# Patient Record
(demographics unavailable — no encounter records)

---

## 2019-08-16 NOTE — MM
Reason for exam: screening  (asymptomatic).

Last mammogram was performed 3 years and 4 months ago.



History:

Patient is nulliparous.

Family history of breast cancer in mother.



Physical Findings:

A clinical breast exam by your physician is recommended on an annual basis and 

results should be correlated with mammographic findings.



MG 3D Screening Mammo W/Cad

Bilateral CC and MLO view(s) were taken.

Prior study comparison: April 4, 2016, mammogram.  August 25, 2014, mammogram.

The breast tissue is heterogeneously dense. This may lower the sensitivity of 

mammography.  No suspicious abnormality.  No significant changes when compared 

with prior studies.





ASSESSMENT: Negative, BI-RAD 1



RECOMMENDATION:

Routine screening mammogram of both breasts in 1 year.

## 2020-12-04 NOTE — MM
Reason for exam: screening  (asymptomatic).

Last mammogram was performed 1 year and 4 months ago.



History:

Patient is postmenopausal and is nulliparous.

Family history of breast cancer in mother.



Physical Findings:

A clinical breast exam by your physician is recommended on an annual basis and 

results should be correlated with mammographic findings.



MG 3D Screening Mammo W/Cad

Bilateral CC and MLO view(s) were taken.

Prior study comparison: August 15, 2019, bilateral MG 3d screening mammo w/cad.  

April 4, 2016, mammogram.

The breast tissue is heterogeneously dense. This may lower the sensitivity of 

mammography.  11 o'clock focal asymmetry appears larger and more defined.





ASSESSMENT: Incomplete: need additional imaging evaluation, BI-RAD 0



RECOMMENDATION:

Special view mammogram of the right breast.

(3D)

Ultrasound of the right breast.



[facility] will attempt to contact patient to return for supplemental 

views and ultrasound.

## 2020-12-11 NOTE — MM
Reason for exam: additional evaluation requested from abnormal screening.

Last mammogram was performed less than 1 month ago.



History:

Patient is postmenopausal and is nulliparous.

Family history of breast cancer in mother.



Physical Findings:

Nurse did not find any significant physical abnormalities on exam.



MG 3D Work Up W/Cad RT

Spot compression CC, spot compression MLO, and ML view(s) were taken of the right 

breast.

Prior study comparison: December 3, 2020, bilateral MG 3d screening mammo w/cad.  

August 15, 2019, bilateral MG 3d screening mammo w/cad.

There are scattered fibroglandular densities.  Focal asymmetry right upper outer 

quadrant. Nodular density disperses on compression.

This finding is changed when compared with previous exams.



These results were verbally communicated with the patient and result sheet given 

to the patient on 12/11/20.





ASSESSMENT: Incomplete: need additional imaging evaluation, BI-RAD 0



RECOMMENDATION:

Ultrasound of the right breast.

## 2020-12-11 NOTE — USB
Reason for exam: additional evaluation requested from abnormal screening.



History:

Patient is postmenopausal and is nulliparous.

Family history of breast cancer in mother.



US Breast Workup Limited RT

Technologist: Rhonda Hendrickson

Right limited breast ultrasound including focal area of concern, retroareolar and 

axilla demonstrates no cystic or solid lesion seen.



These results were verbally communicated with the patient and result sheet given 

to the patient on 12/11/20.





ASSESSMENT: Negative, BI-RAD 1



RECOMMENDATION:

Follow-up diagnostic mammogram of the right breast in 6 months.